# Patient Record
Sex: FEMALE | Race: WHITE | NOT HISPANIC OR LATINO | Employment: FULL TIME | URBAN - METROPOLITAN AREA
[De-identification: names, ages, dates, MRNs, and addresses within clinical notes are randomized per-mention and may not be internally consistent; named-entity substitution may affect disease eponyms.]

---

## 2024-08-09 ENCOUNTER — TELEPHONE (OUTPATIENT)
Dept: ENDOCRINOLOGY | Facility: CLINIC | Age: 64
End: 2024-08-09

## 2024-08-09 NOTE — TELEPHONE ENCOUNTER
Called PtORTEGA to return call. Need to know if she is bringing records with her to her appointment?

## 2024-08-14 ENCOUNTER — OFFICE VISIT (OUTPATIENT)
Dept: ENDOCRINOLOGY | Facility: CLINIC | Age: 64
End: 2024-08-14
Payer: COMMERCIAL

## 2024-08-14 VITALS
HEIGHT: 64 IN | WEIGHT: 198.8 LBS | SYSTOLIC BLOOD PRESSURE: 138 MMHG | TEMPERATURE: 99.2 F | DIASTOLIC BLOOD PRESSURE: 80 MMHG | RESPIRATION RATE: 16 BRPM | BODY MASS INDEX: 33.94 KG/M2 | OXYGEN SATURATION: 97 % | HEART RATE: 57 BPM

## 2024-08-14 DIAGNOSIS — I48.91 ATRIAL FIBRILLATION WITH RVR (HCC): ICD-10-CM

## 2024-08-14 DIAGNOSIS — Z83.3 FAMILY HISTORY OF DIABETES MELLITUS: ICD-10-CM

## 2024-08-14 DIAGNOSIS — E21.3 HYPERPARATHYROIDISM (HCC): ICD-10-CM

## 2024-08-14 DIAGNOSIS — I10 PRIMARY HYPERTENSION: ICD-10-CM

## 2024-08-14 DIAGNOSIS — E05.00 GRAVES DISEASE: Primary | ICD-10-CM

## 2024-08-14 PROBLEM — I50.32 CHRONIC DIASTOLIC (CONGESTIVE) HEART FAILURE (HCC): Status: ACTIVE | Noted: 2020-08-21

## 2024-08-14 PROCEDURE — 99204 OFFICE O/P NEW MOD 45 MIN: CPT | Performed by: NURSE PRACTITIONER

## 2024-08-14 RX ORDER — METHIMAZOLE 10 MG/1
10 TABLET ORAL ONCE
COMMUNITY

## 2024-08-14 RX ORDER — LISINOPRIL 10 MG/1
10 TABLET ORAL DAILY
COMMUNITY

## 2024-08-14 RX ORDER — ERGOCALCIFEROL 1.25 MG/1
50000 CAPSULE ORAL
COMMUNITY

## 2024-08-14 RX ORDER — METOPROLOL TARTRATE 25 MG/1
25 TABLET, FILM COATED ORAL DAILY
COMMUNITY

## 2024-08-14 NOTE — ASSESSMENT & PLAN NOTE
Counseled on basic pathophysiology of Graves disease as well as treatment options including continued methimazole therapy vs definitive therapy with ROQUE or thyroidectomy. Counseled on TSH vs free t4 goals. Check TSH, free T4, and TrAb for baseline. Once labs are reviewed, will make recommendations on methimazole dose. Currently taking 10 mg Monday-Saturday with 20 mg on Sunday.

## 2024-08-14 NOTE — ASSESSMENT & PLAN NOTE
Believed to be related to thyrotoxicosis. Initially dx in 2020. Referral to cardiology provided. Continue metoprolol tartrate 25 mg daily.

## 2024-08-28 LAB
ALBUMIN SERPL-MCNC: 4.2 G/DL (ref 3.6–5.1)
ALBUMIN/GLOB SERPL: 1.6 (CALC) (ref 1–2.5)
ALP SERPL-CCNC: 85 U/L (ref 37–153)
ALT SERPL-CCNC: 12 U/L (ref 6–29)
AST SERPL-CCNC: 20 U/L (ref 10–35)
BILIRUB SERPL-MCNC: 0.7 MG/DL (ref 0.2–1.2)
BUN SERPL-MCNC: 15 MG/DL (ref 7–25)
BUN/CREAT SERPL: NORMAL (CALC) (ref 6–22)
CALCIUM SERPL-MCNC: 9.1 MG/DL (ref 8.6–10.4)
CHLORIDE SERPL-SCNC: 108 MMOL/L (ref 98–110)
CO2 SERPL-SCNC: 25 MMOL/L (ref 20–32)
CREAT SERPL-MCNC: 0.74 MG/DL (ref 0.5–1.05)
GFR/BSA.PRED SERPLBLD CYS-BASED-ARV: 91 ML/MIN/1.73M2
GLOBULIN SER CALC-MCNC: 2.6 G/DL (CALC) (ref 1.9–3.7)
GLUCOSE SERPL-MCNC: 88 MG/DL (ref 65–99)
POTASSIUM SERPL-SCNC: 3.8 MMOL/L (ref 3.5–5.3)
PROT SERPL-MCNC: 6.8 G/DL (ref 6.1–8.1)
SODIUM SERPL-SCNC: 141 MMOL/L (ref 135–146)

## 2024-08-29 DIAGNOSIS — I48.91 ATRIAL FIBRILLATION WITH RVR (HCC): ICD-10-CM

## 2024-08-29 DIAGNOSIS — I10 PRIMARY HYPERTENSION: ICD-10-CM

## 2024-08-29 DIAGNOSIS — E55.9 VITAMIN D DEFICIENCY: ICD-10-CM

## 2024-08-29 DIAGNOSIS — E05.00 GRAVES DISEASE: Primary | ICD-10-CM

## 2024-08-30 RX ORDER — METHIMAZOLE 10 MG/1
TABLET ORAL
Qty: 90 TABLET | Refills: 0 | Status: SHIPPED | OUTPATIENT
Start: 2024-08-30

## 2024-08-30 RX ORDER — LISINOPRIL 10 MG/1
TABLET ORAL
Qty: 90 TABLET | Refills: 1 | Status: SHIPPED | OUTPATIENT
Start: 2024-08-30

## 2024-08-30 RX ORDER — METOPROLOL TARTRATE 25 MG/1
25 TABLET, FILM COATED ORAL DAILY
Qty: 90 TABLET | Refills: 1 | Status: SHIPPED | OUTPATIENT
Start: 2024-08-30

## 2024-08-30 RX ORDER — ERGOCALCIFEROL 1.25 MG/1
50000 CAPSULE ORAL
Qty: 12 CAPSULE | Refills: 0 | Status: SHIPPED | OUTPATIENT
Start: 2024-08-30

## 2024-09-26 DIAGNOSIS — E05.00 GRAVES DISEASE: ICD-10-CM

## 2024-09-27 RX ORDER — METHIMAZOLE 10 MG/1
TABLET ORAL
Qty: 102 TABLET | Refills: 1 | Status: SHIPPED | OUTPATIENT
Start: 2024-09-27

## 2024-09-30 ENCOUNTER — PATIENT MESSAGE (OUTPATIENT)
Dept: ENDOCRINOLOGY | Facility: CLINIC | Age: 64
End: 2024-09-30

## 2024-11-27 ENCOUNTER — RESULTS FOLLOW-UP (OUTPATIENT)
Dept: ENDOCRINOLOGY | Facility: CLINIC | Age: 64
End: 2024-11-27

## 2024-11-30 LAB
T4 FREE SERPL-MCNC: 1.1 NG/DL (ref 0.8–1.8)
TSH BII SER-ACNC: 5.94 IU/L
TSH SERPL-ACNC: 2.03 MIU/L (ref 0.4–4.5)

## 2024-12-03 ENCOUNTER — RESULTS FOLLOW-UP (OUTPATIENT)
Dept: ENDOCRINOLOGY | Facility: CLINIC | Age: 64
End: 2024-12-03

## 2024-12-04 ENCOUNTER — TELEPHONE (OUTPATIENT)
Dept: ENDOCRINOLOGY | Facility: CLINIC | Age: 64
End: 2024-12-04

## 2024-12-04 NOTE — TELEPHONE ENCOUNTER
Provider will be in another location on Thursdays. needs to be put in the first spot on Brittany's schedule.

## 2025-02-04 ENCOUNTER — OFFICE VISIT (OUTPATIENT)
Dept: ENDOCRINOLOGY | Facility: HOSPITAL | Age: 65
End: 2025-02-04
Payer: COMMERCIAL

## 2025-02-04 VITALS
SYSTOLIC BLOOD PRESSURE: 146 MMHG | DIASTOLIC BLOOD PRESSURE: 90 MMHG | HEIGHT: 64 IN | WEIGHT: 206 LBS | HEART RATE: 94 BPM | BODY MASS INDEX: 35.17 KG/M2

## 2025-02-04 DIAGNOSIS — E05.00 GRAVES DISEASE: Primary | ICD-10-CM

## 2025-02-04 DIAGNOSIS — E21.3 HYPERPARATHYROIDISM (HCC): ICD-10-CM

## 2025-02-04 PROCEDURE — 99214 OFFICE O/P EST MOD 30 MIN: CPT | Performed by: PHYSICIAN ASSISTANT

## 2025-02-04 NOTE — PATIENT INSTRUCTIONS
Get lab work completed. We will call with results.    Continue same dose of methimazole.    Make an appointment with Cardiology.    Call with any concerns or questions.    Follow up in 3 months with labs.

## 2025-02-04 NOTE — PROGRESS NOTES
Mechelle Joseph 64 y.o. female MRN: [id number]    Encounter: [id number]      Assessment & Plan     Assessment:  This is a 64 y.o.-year-old female with hyperthyroidism due to Graves' disease, and history of hyperparathyroidism.    Plan:  1.  Hyperthyroidism: Most recent thyroid lab work was completed November 2024 and came back normal.  Clinically and biochemically euthyroid.  I do think would be able to back off on her methimazole slightly, but I would like her to get lab work completed at earliest convenience.  We did discuss pathophysiology of Graves' disease and that it is an autoimmune disease.  Discussed treatment options for her Graves' disease, at this time we will likely just continue with methimazole.  Did discuss side effects of medication.  Complications from hyperthyroidism and symptoms of hypothyroidism.  If there is any concerns or questions she will contact the office.  At this time we will have her follow-up in 3 months with labwork completed prior to visit.  If everything is trending well we will likely extend out office visits every 6 months with lab work every 3 months.    2.  History of hyperparathyroidism: Her history of hyperparathyroidism likely due to low vitamin D.  Most recent lab work over a year ago did show normal parathyroid, calcium, vitamin D.  Would like to repeat lab work at this time just to make sure there is no concerns.  Will make further recommendations once lab results are in.    CC: Hyperthyroidism follow-up    History of Present Illness     HPI:  Mechelle Joseph is a 64 y.o. female with hyperthyroidism due to Graves' disease presenting to establish care at our [place] office.  She was initially seen at our [place] office.  Has moved to the area recently and was previously seeing an endocrinologist in [facility].     Hyperthyroidism initially diagnosed to being hospitalized in August 2020.  Symptoms included 40 pound weight loss, anxiety, and occasional difficulty  breathing for 3 months.  She was diagnosed with A-fib and hyperthyroidism with elevated TSI.  She was started on methimazole 20 mg 3 times a day.  Over the years a dose has been adjusted.  Her methimazole has never been discontinued.    She is currently on methimazole 10 mg 6 days a week and 20 mg 1 day a week.  She is also on metoprolol 25 mg daily. States that she takes it to prevent from going in A-Fib. Has not seen cardiology since moving here.  At this time she continues to have symptoms of fatigue.  Also has concerns with hot flashes and difficulty sleeping, but thinks that part of this may be due to menopause.  Does have a mild tremor.  Denies any headaches, vision changes, palpitations, abdominal pain, diarrhea or constipation, anxiety or depression.  Denies any neck discomfort or difficulty swallowing.  Overall is doing well today.     In 2022, patient was found to be vitamin D deficient with an elevated PTH.  In 2023, despite normalized vitamin D, PTH remained elevated at 88.7 pg/mL.  At that time, patient had cut out dairy and serum calcium was noted to be on the low side of normal.  By November 2023, PTH improved with normal serum calcium and normal vitamin D.  Most recent calcium level was obtained August 2024 was 9.1.  Last time full panel was ordered was November 1, 2023 which revealed a PTH of 59, calcium 9.5, and a vitamin D of 47.      Review of Systems   Constitutional:  Positive for fatigue. Negative for activity change, appetite change, diaphoresis and unexpected weight change.   HENT:  Negative for sore throat, trouble swallowing and voice change.    Eyes:  Negative for visual disturbance.   Respiratory:  Negative for chest tightness and shortness of breath.    Cardiovascular:  Negative for chest pain, palpitations and leg swelling.   Gastrointestinal:  Negative for abdominal pain, constipation and diarrhea.   Endocrine: Negative for cold intolerance, heat intolerance, polydipsia, polyphagia  "and polyuria.        Hot flashes   Skin:  Negative for rash.   Neurological:  Negative for dizziness, tremors, light-headedness, numbness and headaches.   Hematological:  Negative for adenopathy.   Psychiatric/Behavioral:  Positive for sleep disturbance. Negative for dysphoric mood. The patient is not nervous/anxious.        Historical Information   Past Medical History:   Diagnosis Date   • A-fib (HCC)    • Hyperthyroidism    • Secondary hypertension      Past Surgical History:   Procedure Laterality Date   • UTERINE FIBROID EMBOLIZATION Right      Social History   Social History     Substance and Sexual Activity   Alcohol Use Never     Social History     Substance and Sexual Activity   Drug Use Never     Social History     Tobacco Use   Smoking Status Never   • Passive exposure: Never   Smokeless Tobacco Never     Family History:   Family History   Problem Relation Age of Onset   • Diabetes unspecified Maternal Grandfather        Meds/Allergies   Current Outpatient Medications   Medication Sig Dispense Refill   • lisinopril (ZESTRIL) 10 mg tablet Take 1 tablet (10 mg) daily. 90 tablet 1   • methimazole (TAPAZOLE) 10 mg tablet TAKE 1 TABLET (10 MG) MONDAY-SATURDAY. TAKE 2 TABLETS (20 MG) ON SUNDAY. 102 tablet 1   • metoprolol tartrate (LOPRESSOR) 25 mg tablet Take 1 tablet (25 mg total) by mouth daily 90 tablet 1   • Vitamin D, Ergocalciferol, 87047 units CAPS Take 50,000 Units by mouth every 7 days 12 capsule 0     No current facility-administered medications for this visit.     No Known Allergies    Objective   Vitals: Blood pressure 146/90, pulse 94, height 5' 4\" (1.626 m), weight 93.4 kg (206 lb).    Physical Exam  Vitals and nursing note reviewed.   Constitutional:       General: She is not in acute distress.     Appearance: Normal appearance. She is not diaphoretic.   HENT:      Head: Normocephalic and atraumatic.   Eyes:      General: No scleral icterus.     Extraocular Movements: Extraocular movements " "intact.      Conjunctiva/sclera: Conjunctivae normal.      Pupils: Pupils are equal, round, and reactive to light.   Cardiovascular:      Rate and Rhythm: Normal rate and regular rhythm.      Heart sounds: No murmur heard.  Pulmonary:      Effort: Pulmonary effort is normal. No respiratory distress.      Breath sounds: Normal breath sounds. No wheezing.   Musculoskeletal:      Cervical back: Normal range of motion.      Right lower leg: No edema.      Left lower leg: No edema.   Lymphadenopathy:      Cervical: No cervical adenopathy.   Neurological:      Mental Status: She is alert and oriented to person, place, and time. Mental status is at baseline.      Sensory: No sensory deficit.      Gait: Gait normal.   Psychiatric:         Mood and Affect: Mood normal.         Behavior: Behavior normal.         Thought Content: Thought content normal.         The history was obtained from the review of the chart, patient.    Lab Results:   Lab Results   Component Value Date/Time    Free t4 1.1 11/26/2024 01:49 PM       Imaging Studies:       Results Review Statement: No pertinent imaging studies reviewed.    Portions of the record may have been created with voice recognition software. Occasional wrong word or \"sound a like\" substitutions may have occurred due to the inherent limitations of voice recognition software. Read the chart carefully and recognize, using context, where substitutions have occurred.    "

## 2025-02-27 DIAGNOSIS — E05.00 GRAVES DISEASE: ICD-10-CM

## 2025-02-27 RX ORDER — METHIMAZOLE 10 MG/1
TABLET ORAL
Qty: 102 TABLET | Refills: 1 | Status: SHIPPED | OUTPATIENT
Start: 2025-02-27

## 2025-03-02 DIAGNOSIS — I10 PRIMARY HYPERTENSION: ICD-10-CM

## 2025-03-02 DIAGNOSIS — I48.91 ATRIAL FIBRILLATION WITH RVR (HCC): ICD-10-CM

## 2025-03-03 RX ORDER — LISINOPRIL 10 MG/1
TABLET ORAL
Qty: 90 TABLET | Refills: 1 | Status: SHIPPED | OUTPATIENT
Start: 2025-03-03

## 2025-03-03 RX ORDER — METOPROLOL TARTRATE 25 MG/1
25 TABLET, FILM COATED ORAL DAILY
Qty: 90 TABLET | Refills: 1 | Status: SHIPPED | OUTPATIENT
Start: 2025-03-03

## 2025-05-10 LAB
25(OH)D3 SERPL-MCNC: 80 NG/ML (ref 30–100)
ALBUMIN SERPL-MCNC: 4.1 G/DL (ref 3.6–5.1)
ALBUMIN/GLOB SERPL: 1.6 (CALC) (ref 1–2.5)
ALP SERPL-CCNC: 77 U/L (ref 37–153)
ALT SERPL-CCNC: 12 U/L (ref 6–29)
AST SERPL-CCNC: 18 U/L (ref 10–35)
BILIRUB SERPL-MCNC: 0.5 MG/DL (ref 0.2–1.2)
BUN SERPL-MCNC: 20 MG/DL (ref 7–25)
BUN/CREAT SERPL: NORMAL (CALC) (ref 6–22)
CALCIUM SERPL-MCNC: 8.9 MG/DL (ref 8.6–10.4)
CALCIUM SERPL-MCNC: 8.9 MG/DL (ref 8.6–10.4)
CHLORIDE SERPL-SCNC: 108 MMOL/L (ref 98–110)
CO2 SERPL-SCNC: 26 MMOL/L (ref 20–32)
CREAT SERPL-MCNC: 0.75 MG/DL (ref 0.5–1.05)
GFR/BSA.PRED SERPLBLD CYS-BASED-ARV: 90 ML/MIN/1.73M2
GLOBULIN SER CALC-MCNC: 2.5 G/DL (CALC) (ref 1.9–3.7)
GLUCOSE SERPL-MCNC: 90 MG/DL (ref 65–99)
POTASSIUM SERPL-SCNC: 3.8 MMOL/L (ref 3.5–5.3)
PROT SERPL-MCNC: 6.6 G/DL (ref 6.1–8.1)
PTH-INTACT SERPL-MCNC: 65 PG/ML (ref 16–77)
SODIUM SERPL-SCNC: 143 MMOL/L (ref 135–146)
T3FREE SERPL-MCNC: 3.1 PG/ML (ref 2.3–4.2)
T4 FREE SERPL-MCNC: 0.9 NG/DL (ref 0.8–1.8)
TSH SERPL-ACNC: 5.19 MIU/L (ref 0.4–4.5)

## 2025-05-12 ENCOUNTER — RESULTS FOLLOW-UP (OUTPATIENT)
Dept: ENDOCRINOLOGY | Facility: HOSPITAL | Age: 65
End: 2025-05-12

## 2025-05-14 ENCOUNTER — OFFICE VISIT (OUTPATIENT)
Dept: ENDOCRINOLOGY | Facility: HOSPITAL | Age: 65
End: 2025-05-14
Payer: COMMERCIAL

## 2025-05-14 VITALS
HEIGHT: 64 IN | DIASTOLIC BLOOD PRESSURE: 90 MMHG | WEIGHT: 208.8 LBS | SYSTOLIC BLOOD PRESSURE: 142 MMHG | BODY MASS INDEX: 35.65 KG/M2 | HEART RATE: 56 BPM

## 2025-05-14 DIAGNOSIS — E05.00 GRAVES DISEASE: ICD-10-CM

## 2025-05-14 DIAGNOSIS — E05.90 HYPERTHYROIDISM: Primary | ICD-10-CM

## 2025-05-14 PROCEDURE — 99214 OFFICE O/P EST MOD 30 MIN: CPT | Performed by: PHYSICIAN ASSISTANT

## 2025-05-14 NOTE — ASSESSMENT & PLAN NOTE
No evidence of Graves' ophthalmopathy.  Orders:  •  TSH, 3rd generation; Future  •  T4, free; Future  •  T3, free; Future  •  Comprehensive metabolic panel; Future  •  TSH, 3rd generation; Future  •  T4, free; Future  •  T3, free; Future

## 2025-05-14 NOTE — PROGRESS NOTES
Name: Mechelle Joseph      : 1960      MRN: [id number]  Encounter Provider: Paras Saha PA-C  Encounter Date: 2025   Encounter department: [facility]    No chief complaint on file.  :  Assessment & Plan  Hyperthyroidism  Most recent thyroid lab work came back with an elevated TSH, low end of normal free T4, and normal T3.  At this time I would like to decrease her methimazole to 10 mg 5 days a week and she will skip Wednesday and .  This will give her an equivalent of 7.5 mg daily.  I asked her to repeat lab work in about 6 weeks.  Contact the office if there is any concerns or questions.  Follow-up in 3 months with labwork completed prior to visit.  Orders:  •  TSH, 3rd generation; Future  •  T4, free; Future  •  T3, free; Future  •  Comprehensive metabolic panel; Future  •  TSH, 3rd generation; Future  •  T4, free; Future  •  T3, free; Future    Graves disease  No evidence of Graves' ophthalmopathy.  Orders:  •  TSH, 3rd generation; Future  •  T4, free; Future  •  T3, free; Future  •  Comprehensive metabolic panel; Future  •  TSH, 3rd generation; Future  •  T4, free; Future  •  T3, free; Future        History of Present Illness     Mechelle Joseph is a 64 y.o. female with hyperthyroidism due to Graves' disease presenting for follow-up.     Hyperthyroidism initially diagnosed to being hospitalized in 2020.  Symptoms included 40 pound weight loss, anxiety, and occasional difficulty breathing for 3 months.  She was diagnosed with A-fib and hyperthyroidism with elevated TSI.  She was started on methimazole 20 mg 3 times a day.  Over the years a dose has been adjusted.  Her methimazole has never been discontinued.     She is currently on methimazole 10 mg 6 days a week and 20 mg 1 day a week.  Since her diagnosis has never been completely off methimazole.  She is also on metoprolol 25 mg daily. States that she takes it to prevent from  going in A-Fib. Has not seen cardiology since moving here.  At this time she continues to have symptoms of fatigue.  Also has concerns with hot flashes and difficulty sleeping, but thinks that part of this may be due to menopause.  Does have a mild tremor.  Denies any headaches, vision changes, palpitations, abdominal pain, diarrhea or constipation, anxiety or depression.  Denies any neck discomfort or difficulty swallowing.  Overall is doing well today.     In 2022, patient was found to be vitamin D deficient with an elevated PTH.  In 2023, despite normalized vitamin D, PTH remained elevated at 88.7 pg/mL.  At that time, patient had cut out dairy and serum calcium was noted to be on the low side of normal.  By November 2023, PTH improved with normal serum calcium and normal vitamin D.  Lab work completed May 9, 2025 revealed a PTH of 6.5 with a calcium of 8.9 and a vitamin D of 80.    Review of Systems   Constitutional:  Positive for fatigue. Negative for activity change, appetite change, diaphoresis and unexpected weight change.   HENT:  Negative for sore throat, trouble swallowing and voice change.    Eyes:  Negative for visual disturbance.   Respiratory:  Negative for chest tightness and shortness of breath.    Cardiovascular:  Negative for chest pain, palpitations and leg swelling.   Gastrointestinal:  Negative for abdominal pain, constipation and diarrhea.   Endocrine: Negative for cold intolerance, heat intolerance, polydipsia, polyphagia and polyuria.        Hot flashes   Skin:  Negative for rash.   Neurological:  Negative for dizziness, tremors, light-headedness, numbness and headaches.   Hematological:  Negative for adenopathy.   Psychiatric/Behavioral:  Positive for sleep disturbance. Negative for dysphoric mood. The patient is not nervous/anxious.     as per HPI  Medical History Reviewed by provider this encounter:     .  Medications Ordered Prior to Encounter[1]   Social History     Tobacco Use   •  "Smoking status: Never     Passive exposure: Never   • Smokeless tobacco: Never   Vaping Use   • Vaping status: Never Used   Substance and Sexual Activity   • Alcohol use: Never   • Drug use: Never   • Sexual activity: Not on file        Medical History Reviewed by provider this encounter:     .    Objective   /90   Pulse 56   Ht 5' 4\" (1.626 m)   Wt 94.7 kg (208 lb 12.8 oz)   BMI 35.84 kg/m²      Body mass index is 35.84 kg/m².  Wt Readings from Last 3 Encounters:   05/14/25 94.7 kg (208 lb 12.8 oz)   02/04/25 93.4 kg (206 lb)   08/14/24 90.2 kg (198 lb 12.8 oz)     Physical Exam  Vitals and nursing note reviewed.   Constitutional:       General: She is not in acute distress.     Appearance: Normal appearance. She is well-developed. She is not diaphoretic.     Eyes:      General: No scleral icterus.     Extraocular Movements: Extraocular movements intact.      Conjunctiva/sclera: Conjunctivae normal.      Pupils: Pupils are equal, round, and reactive to light.     Neck:      Thyroid: Thyromegaly present. No thyroid mass or thyroid tenderness.     Cardiovascular:      Rate and Rhythm: Normal rate and regular rhythm.      Pulses: Normal pulses.      Heart sounds: Normal heart sounds. No murmur heard.     No friction rub. No gallop.   Pulmonary:      Effort: Pulmonary effort is normal. No tachypnea, bradypnea or respiratory distress.      Breath sounds: Normal breath sounds. No wheezing.     Musculoskeletal:      Cervical back: Normal range of motion.      Right lower leg: No edema.      Left lower leg: No edema.   Lymphadenopathy:      Cervical: No cervical adenopathy.     Skin:     General: Skin is warm and dry.     Neurological:      Mental Status: She is alert and oriented to person, place, and time. Mental status is at baseline. She is not disoriented.      Motor: No abnormal muscle tone.      Gait: Gait normal.      Deep Tendon Reflexes: Reflexes are normal and symmetric.     Psychiatric:         Mood " "and Affect: Mood normal.         Behavior: Behavior normal.         Thought Content: Thought content normal.         Labs:   No results found for: \"HGBA1C\"  Lab Results   Component Value Date    CREATININE 0.75 05/09/2025    CREATININE 0.74 08/27/2024    BUN 20 05/09/2025    K 3.8 05/09/2025     05/09/2025    CO2 26 05/09/2025     eGFR   Date Value Ref Range Status   05/09/2025 90 > OR = 60 mL/min/1.73m2 Final     No results found for: \"CHOL\", \"HDL\", \"LDL\", \"TRIG\", \"CHOLHDL\"  Lab Results   Component Value Date    ALT 12 05/09/2025    AST 18 05/09/2025    ALKPHOS 77 05/09/2025     No results found for: \"PFT7VKUSXNCY\"  Lab Results   Component Value Date    FREET4 0.9 05/09/2025       Patient Instructions   Get lab work completed. We will call with results.     Decrease methimazole to 5 times a week. Skip Wednesday and Sunday.    Get lab work in about 6 weeks.     Call with any concerns or questions.     Follow up in 3 months with labs.    Discussed with the patient and all questioned fully answered. She will call me if any problems arise.             [1]  Current Outpatient Medications on File Prior to Visit   Medication Sig Dispense Refill   • lisinopril (ZESTRIL) 10 mg tablet TAKE 1 TABLET (10 MG) DAILY 90 tablet 1   • methimazole (TAPAZOLE) 10 mg tablet TAKE 1 TABLET (10 MG) MONDAY-SATURDAY. TAKE 2 TABLETS (20 MG) ON SUNDAY. 102 tablet 1   • metoprolol tartrate (LOPRESSOR) 25 mg tablet TAKE 1 TABLET (25 MG TOTAL) BY MOUTH DAILY. 90 tablet 1   • Vitamin D, Ergocalciferol, 48442 units CAPS Take 50,000 Units by mouth every 7 days 12 capsule 0     No current facility-administered medications on file prior to visit.   "

## 2025-05-14 NOTE — PATIENT INSTRUCTIONS
Get lab work completed. We will call with results.     Decrease methimazole to 5 times a week. Skip Wednesday and Sunday.    Get lab work in about 6 weeks.     Call with any concerns or questions.     Follow up in 3 months with labs.

## 2025-07-29 ENCOUNTER — OFFICE VISIT (OUTPATIENT)
Dept: CARDIOLOGY CLINIC | Facility: CLINIC | Age: 65
End: 2025-07-29
Payer: COMMERCIAL

## 2025-07-29 VITALS
BODY MASS INDEX: 35.68 KG/M2 | WEIGHT: 209 LBS | DIASTOLIC BLOOD PRESSURE: 80 MMHG | SYSTOLIC BLOOD PRESSURE: 132 MMHG | HEIGHT: 64 IN | HEART RATE: 63 BPM

## 2025-07-29 DIAGNOSIS — I48.91 ATRIAL FIBRILLATION WITH RVR (HCC): Primary | ICD-10-CM

## 2025-07-29 DIAGNOSIS — I50.32 CHRONIC DIASTOLIC (CONGESTIVE) HEART FAILURE (HCC): ICD-10-CM

## 2025-07-29 DIAGNOSIS — I10 PRIMARY HYPERTENSION: ICD-10-CM

## 2025-07-29 PROCEDURE — 99204 OFFICE O/P NEW MOD 45 MIN: CPT | Performed by: INTERNAL MEDICINE

## 2025-07-29 PROCEDURE — 93000 ELECTROCARDIOGRAM COMPLETE: CPT | Performed by: INTERNAL MEDICINE

## 2025-07-29 RX ORDER — METOPROLOL SUCCINATE 25 MG/1
25 TABLET, EXTENDED RELEASE ORAL DAILY
Qty: 90 TABLET | Refills: 3 | Status: SHIPPED | OUTPATIENT
Start: 2025-07-29